# Patient Record
Sex: FEMALE | Race: OTHER | HISPANIC OR LATINO | Employment: UNEMPLOYED | URBAN - METROPOLITAN AREA
[De-identification: names, ages, dates, MRNs, and addresses within clinical notes are randomized per-mention and may not be internally consistent; named-entity substitution may affect disease eponyms.]

---
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
## 2023-06-12 ENCOUNTER — OFFICE VISIT (OUTPATIENT)
Dept: OBGYN CLINIC | Facility: MEDICAL CENTER | Age: 18
End: 2023-06-12
Payer: COMMERCIAL

## 2023-06-12 VITALS
HEIGHT: 60 IN | DIASTOLIC BLOOD PRESSURE: 68 MMHG | BODY MASS INDEX: 30.23 KG/M2 | SYSTOLIC BLOOD PRESSURE: 112 MMHG | WEIGHT: 154 LBS

## 2023-06-12 DIAGNOSIS — Z97.5 BREAKTHROUGH BLEEDING ON NEXPLANON: Primary | ICD-10-CM

## 2023-06-12 DIAGNOSIS — N92.1 BREAKTHROUGH BLEEDING ON NEXPLANON: Primary | ICD-10-CM

## 2023-06-12 PROCEDURE — 99213 OFFICE O/P EST LOW 20 MIN: CPT | Performed by: NURSE PRACTITIONER

## 2023-06-12 RX ORDER — LEVONORGESTREL AND ETHINYL ESTRADIOL 0.1-0.02MG
1 KIT ORAL DAILY
Qty: 90 TABLET | Refills: 0 | Status: SHIPPED | OUTPATIENT
Start: 2023-06-12

## 2023-06-12 NOTE — PROGRESS NOTES
Assessment/Plan:      Diagnoses and all orders for this visit:    Breakthrough bleeding on Nexplanon  -     levonorgestrel-ethinyl estradiol (Aviane) 0 1-20 MG-MCG per tablet; Take 1 tablet by mouth daily          -Reviewed with patient Nexplanon is effective for 3 years  Written information provided and common side effects reviewed  Reviewed with patient common side effect is irregular unpredictable vaginal bleeding     -Rx Aviane 1 tablet daily  Common side effects reviewed  Aches reviewed  Written information provided  -Reviewed recommendation for gonorrhea/chlamydia collection yearly under age 22  Patient declines testing at today's visit  -Reviewed and encouraged safe sexual practices including consistent condom use  -Encouraged to avoid alcohol, tobacco/nicotine and drug use  RTO annual exam    Subjective:     Patient ID: She Mathew is a 21 y o  female  HPI  here with complaints of vaginal bleeding for the past 2 weeks  LMP 23  Menses have been irregular since Nexplanon insertion  Difficult for patient to quantify bleeding  States she has not had 2 weeks of bleeding in a row since using OCPs  Had a trial of OCPs last year  After using OCPs abnormal bleeding improved but did not completely resolve  Patient verbalized understanding this is a side effect from Nexplanon  Is interested in another round of OCPs to see if bleeding improves even more    Pap smears to begin at age 22  Last gonorrhea/chlamydia 10/18/21 negative  Gardasil vaccine series uncertain    Review of Systems   Constitutional: Negative for chills and fever  Respiratory: Negative  Cardiovascular: Negative  Objective:  /68   Ht 5' (1 524 m)   Wt 69 9 kg (154 lb)   LMP 2023 (Approximate)   BMI 30 08 kg/m²      Physical Exam  Constitutional:       Appearance: Normal appearance  Neurological:      Mental Status: She is alert and oriented to person, place, and time     Psychiatric: Mood and Affect: Mood normal          Behavior: Behavior normal

## 2023-08-10 ENCOUNTER — OFFICE VISIT (OUTPATIENT)
Dept: URGENT CARE | Age: 18
End: 2023-08-10
Payer: COMMERCIAL

## 2023-08-10 VITALS
BODY MASS INDEX: 30.7 KG/M2 | TEMPERATURE: 97.3 F | HEIGHT: 60 IN | RESPIRATION RATE: 20 BRPM | OXYGEN SATURATION: 98 % | SYSTOLIC BLOOD PRESSURE: 116 MMHG | WEIGHT: 156.4 LBS | HEART RATE: 89 BPM | DIASTOLIC BLOOD PRESSURE: 64 MMHG

## 2023-08-10 DIAGNOSIS — H65.92 LEFT NON-SUPPURATIVE OTITIS MEDIA: Primary | ICD-10-CM

## 2023-08-10 PROCEDURE — 99213 OFFICE O/P EST LOW 20 MIN: CPT

## 2023-08-10 RX ORDER — AMOXICILLIN 500 MG/1
500 CAPSULE ORAL EVERY 12 HOURS SCHEDULED
Qty: 14 CAPSULE | Refills: 0 | Status: SHIPPED | OUTPATIENT
Start: 2023-08-10 | End: 2023-08-17

## 2023-08-10 NOTE — LETTER
August 10, 2023     Patient: Melody Barnett   YOB: 2005   Date of Visit: 8/10/2023       To Whom it May Concern:    Sangeeta Vazquez was seen in my clinic on 8/10/2023. Please excuse her from work today. Thank you.        Sincerely,          Fadumo Woods

## 2023-08-10 NOTE — PROGRESS NOTES
[facility]        NAME: Slime Marcelino is a 21 y.o. female  : 2005    MRN: [id number]  DATE: August 10, 2023  TIME: 5:03 PM    Assessment and Plan   Left non-suppurative otitis media [H65.92]  1. Left non-suppurative otitis media  amoxicillin (AMOXIL) 500 mg capsule            Patient Instructions     Follow up with PCP in 2-3 days. Proceed to ER if symptoms worsen. Chief Complaint     Chief Complaint   Patient presents with   • Earache     Pt started yesterday with left ear pressure and pain, worsened today. No OTC pain meds taken. Denies fevers. History of Present Illness     21 y.o. F  L ear pain & cough x 1 day  Denies fevers  No CP or SOB  +sick contact  No OTC meds  Denies recent travel  No swimming    Review of Systems   Review of Systems   Constitutional: Negative for chills, fatigue and fever. HENT: Positive for ear pain. Negative for congestion, facial swelling, hearing loss, rhinorrhea, sinus pressure, sneezing, sore throat and trouble swallowing. Eyes: Negative for pain, redness and visual disturbance. Respiratory: Positive for cough. Negative for chest tightness, shortness of breath and wheezing. Cardiovascular: Negative for chest pain and palpitations. Gastrointestinal: Negative for abdominal pain, diarrhea, nausea and vomiting. Genitourinary: Negative for dysuria, flank pain, hematuria and pelvic pain. Musculoskeletal: Negative for arthralgias, back pain and myalgias. Skin: Negative for color change and rash. Neurological: Negative for dizziness, seizures, syncope, weakness, light-headedness and headaches. Psychiatric/Behavioral: Negative for confusion, hallucinations and sleep disturbance. The patient is not nervous/anxious. All other systems reviewed and are negative.         Current Medications       Current Outpatient Medications:   •  amoxicillin (AMOXIL) 500 mg capsule, Take 1 capsule (500 mg total) by mouth every 12 (twelve) hours for 7 days, Disp: 14 capsule, Rfl: 0  •  etonogestrel (NEXPLANON) subdermal implant, 68 mg by Subdermal route once, Disp: , Rfl:   •  levonorgestrel-ethinyl estradiol (Aviane) 0.1-20 MG-MCG per tablet, Take 1 tablet by mouth daily (Patient not taking: Reported on 8/10/2023), Disp: 90 tablet, Rfl: 0    Current Allergies     Allergies as of 08/10/2023 - Reviewed 08/10/2023   Allergen Reaction Noted   • Other Rash 02/06/2017            The following portions of the patient's history were reviewed and updated as appropriate: allergies, current medications, past family history, past medical history, past social history, past surgical history and problem list.     Past Medical History:   Diagnosis Date   • Allergic    • Visual impairment     glasses       History reviewed. No pertinent surgical history. Family History   Problem Relation Age of Onset   • Diabetes Mother    • Hypertension Mother    • Hypertension Father          Medications have been verified. Objective   /64 (BP Location: Right arm, Patient Position: Sitting, Cuff Size: Standard)   Pulse 89   Temp (!) 97.3 °F (36.3 °C) (Tympanic)   Resp 20   Ht 5' (1.524 m)   Wt 70.9 kg (156 lb 6.4 oz)   SpO2 98%   BMI 30.54 kg/m²   No LMP recorded. Patient has had an implant. Physical Exam     Physical Exam  Vitals reviewed. Constitutional:       General: She is not in acute distress. Appearance: Normal appearance. She is not toxic-appearing. HENT:      Head: Normocephalic. Right Ear: Tympanic membrane normal. Tympanic membrane is not erythematous or bulging. Left Ear: Tympanic membrane is erythematous. Tympanic membrane is not bulging. Nose: No congestion or rhinorrhea. Right Sinus: No maxillary sinus tenderness or frontal sinus tenderness. Left Sinus: No maxillary sinus tenderness or frontal sinus tenderness. Mouth/Throat:      Pharynx: Uvula midline.  No oropharyngeal exudate, posterior oropharyngeal erythema or uvula swelling. Tonsils: No tonsillar exudate or tonsillar abscesses. Eyes:      Extraocular Movements: Extraocular movements intact. Conjunctiva/sclera: Conjunctivae normal.      Pupils: Pupils are equal, round, and reactive to light. Cardiovascular:      Rate and Rhythm: Normal rate and regular rhythm. Pulses: Normal pulses. Heart sounds: Normal heart sounds. Pulmonary:      Effort: Pulmonary effort is normal. No tachypnea or respiratory distress. Breath sounds: Normal breath sounds and air entry. No decreased breath sounds, wheezing, rhonchi or rales. Abdominal:      General: Bowel sounds are normal.      Palpations: Abdomen is soft. Tenderness: There is no abdominal tenderness. There is no right CVA tenderness or guarding. Musculoskeletal:         General: Normal range of motion. Cervical back: Normal range of motion. Lymphadenopathy:      Cervical: No cervical adenopathy. Skin:     General: Skin is warm and dry. Neurological:      General: No focal deficit present. Mental Status: She is alert. Sensory: Sensation is intact. Motor: Motor function is intact. Coordination: Coordination is intact. Gait: Gait is intact. Deep Tendon Reflexes: Reflexes are normal and symmetric.

## 2024-02-28 ENCOUNTER — CONSULT (OUTPATIENT)
Dept: OBGYN CLINIC | Facility: MEDICAL CENTER | Age: 19
End: 2024-02-28
Payer: COMMERCIAL

## 2024-02-28 VITALS
SYSTOLIC BLOOD PRESSURE: 118 MMHG | HEIGHT: 60 IN | DIASTOLIC BLOOD PRESSURE: 70 MMHG | WEIGHT: 156.7 LBS | BODY MASS INDEX: 30.77 KG/M2

## 2024-02-28 DIAGNOSIS — Z30.46 FAMILY PLANNING, SUBDERMAL CONTRACEPTIVE CHECKING/REINSERTION/REMOVAL: Primary | ICD-10-CM

## 2024-02-28 PROCEDURE — 11983 REMOVE/INSERT DRUG IMPLANT: CPT | Performed by: OBSTETRICS & GYNECOLOGY

## 2024-02-28 NOTE — PATIENT INSTRUCTIONS
Etonogestrel (Nexplanon, Implanon) - (Implant)   Why this medicine is used:   Prevents pregnancy.  Contact a nurse or doctor right away if you have:  Chest pain, trouble breathing, coughing up blood  Dark urine or pale stools, yellow skin or eyes  Numbness or weakness, problems with vision, speech, or walking, leg pain  Pain in your lower leg (calf) or severe pain in your abdomen  Heavy vaginal bleeding  Nausea, vomiting, loss of appetite, stomach pain, headache     Common side effects:  Acne or pimples, mood changes, weight gain  Pain, tingling, bleeding, bruising, redness, itching, or swelling where the implant is placed  © Copyright Merative 2023 Information is for End User's use only and may not be sold, redistributed or otherwise used for commercial purposes.

## 2024-02-28 NOTE — PROGRESS NOTES
"Universal Protocol:  Consent: Written consent obtained.  Risks and benefits: risks, benefits and alternatives were discussed  Consent given by: patient  Time out: Immediately prior to procedure a \"time out\" was called to verify the correct patient, procedure, equipment, support staff and site/side marked as required.  Patient understanding: patient states understanding of the procedure being performed  Patient consent: the patient's understanding of the procedure matches consent given  Procedure consent: procedure consent matches procedure scheduled  Relevant documents: relevant documents present and verified  Test results: test results available and properly labeled  Patient identity confirmed: verbally with patient  Remove and insert drug implant    Date/Time: 2/28/2024 2:30 PM    Performed by: Rula Desouza MD  Authorized by: Rula Desouza MD    Indication:     Indication: Presence of non-biodegradable drug delivery implant    Pre-procedure:     Pre-procedure timeout performed: yes      Prepped with: povidone-iodine      Local anesthetic:  Lidocaine 1%  Procedure:     Procedure:  Removal with reinsertion    Small stab incision was made in arm: yes      Left/right:  Left  Comments:      Once assured of adequate anesthesia, a 2 mm skin incision was made with a knife.  The Nexplanon was grasped with a mosquito clamp and removed in its entirety.  The preloaded contraceptive capsule trocar was placed subdermally.  Contraceptive capsule was inserted and trocar removed.  Palpation confirmed by provider.  Site was closed with Steri-Strips and pressure bandage applied.     "

## 2024-06-13 DIAGNOSIS — Z97.5 BREAKTHROUGH BLEEDING ON NEXPLANON: ICD-10-CM

## 2024-06-13 DIAGNOSIS — N92.1 BREAKTHROUGH BLEEDING ON NEXPLANON: ICD-10-CM

## 2024-06-13 RX ORDER — LEVONORGESTREL/ETHIN.ESTRADIOL 0.1-0.02MG
1 TABLET ORAL DAILY
Qty: 90 TABLET | Refills: 1 | Status: SHIPPED | OUTPATIENT
Start: 2024-06-13

## 2025-01-06 ENCOUNTER — VBI (OUTPATIENT)
Dept: ADMINISTRATIVE | Facility: OTHER | Age: 20
End: 2025-01-06

## 2025-03-05 ENCOUNTER — OFFICE VISIT (OUTPATIENT)
Dept: URGENT CARE | Facility: CLINIC | Age: 20
End: 2025-03-05
Payer: COMMERCIAL

## 2025-03-05 VITALS
OXYGEN SATURATION: 99 % | DIASTOLIC BLOOD PRESSURE: 62 MMHG | HEART RATE: 97 BPM | TEMPERATURE: 98.2 F | BODY MASS INDEX: 27.73 KG/M2 | WEIGHT: 142 LBS | SYSTOLIC BLOOD PRESSURE: 106 MMHG | RESPIRATION RATE: 16 BRPM

## 2025-03-05 DIAGNOSIS — R53.83 OTHER FATIGUE: Primary | ICD-10-CM

## 2025-03-05 DIAGNOSIS — Z02.89 ENCOUNTER TO OBTAIN EXCUSE FROM WORK: ICD-10-CM

## 2025-03-05 PROCEDURE — 99212 OFFICE O/P EST SF 10 MIN: CPT

## 2025-03-05 PROCEDURE — 87636 SARSCOV2 & INF A&B AMP PRB: CPT

## 2025-03-05 NOTE — LETTER
March 5, 2025     Patient: Amy Jaramillo   YOB: 2005   Date of Visit: 3/5/2025       To Whom it May Concern:    Amy Jaramillo was seen in my clinic on 3/5/2025. She may return to work on 03/06/2025 .    If you have any questions or concerns, please don't hesitate to call.         Sincerely,          DIANA Resendez        CC: No Recipients

## 2025-03-05 NOTE — PATIENT INSTRUCTIONS
You have a Covid/Flu PCR test pending. You can download [facility] for the results which take approximately 24-48 hours. You will be notified if positive.      Your symptoms are consistent with a viral illness.    For nasal/sinus congestion you can try steam, warm compresses, saline nasal spray, Neti pot, nasal steroid (Flonase, Nasocort), or nasal decongestant (Afrin - for 3 days only).    You can try a decongestant (Sudafed) if > 6 years of age and no history of high blood pressure.    For cough you can take an over-the-counter expectorant such as plain Robitussion or Mucinex. A spoonful of honey at bedtime may also be helpful.    For sore throat you can use Cepacol lozenges, do warm salt water gargles, drink warm water with lemon or herbal teas, or use an over-the-counter throat spray (Chloraseptic).    You can take ibuprofen/Motrin and acetaminophen/Tylenol as needed for pain, fever, body aches. Do not take ibuprofen/Motrin/Advil if you have a history of heart disease, bleeding ulcers, or if you take blood thinners.     Drink plenty of fluids to stay hydrated. Airborne or Emergen-C for extra vitamin C and zinc.    Follow up with your PCP in 3-5 days for persistent symptoms.    Go to the ER if symptoms worsen.

## 2025-03-05 NOTE — PROGRESS NOTES
[facility]        NAME: Amy Jaramillo is a 19 y.o. female  : 2005    MRN: [id number]  DATE: 2025  TIME: 11:33 AM    Assessment and Plan   Other fatigue [R53.83]  1. Other fatigue  Covid/Flu- Office Collect Normal      2. Encounter to obtain excuse from work              Patient Instructions     You have a Covid/Flu PCR test pending. [facility] results which take approximately 24-48 hours. You will be notified if positive.      Your symptoms are consistent with a viral illness.    For nasal/sinus congestion you can try steam, warm compresses, saline nasal spray, Neti pot, nasal steroid (Flonase, Nasocort), or nasal decongestant (Afrin - for 3 days only).    You can try a decongestant (Sudafed) if > 6 years of age and no history of high blood pressure.    For cough you can take an over-the-counter expectorant such as plain Robitussion or Mucinex. A spoonful of honey at bedtime may also be helpful.    For sore throat you can use Cepacol lozenges, do warm salt water gargles, drink warm water with lemon or herbal teas, or use an over-the-counter throat spray (Chloraseptic).    You can take ibuprofen/Motrin and acetaminophen/Tylenol as needed for pain, fever, body aches. Do not take ibuprofen/Motrin/Advil if you have a history of heart disease, bleeding ulcers, or if you take blood thinners.     Drink plenty of fluids to stay hydrated. Airborne or Emergen-C for extra vitamin C and zinc.    Follow up with your PCP in 3-5 days for persistent symptoms.    Go to the ER if symptoms worsen.     If tests are performed, our office will contact you with results only if changes need to made to the care plan discussed with you at the visit. You can review your full results on [facility].      Chief Complaint     Chief Complaint   Patient presents with    Nausea     Pt presents with nausea and fatigue that started at work today.  Pt is requesting work note.           History of Present Illness       19-year-old female who presents with request for return to work note after leaving today when she experienced sudden onset fatigue and nausea. Patient denies fevers/chills, congestion, sore throat, cough, CP, SOB, abdominal pain, vomiting, diarrhea, and rash. No OTC treatments tried prior to arrival.        Review of Systems   Review of Systems   Constitutional:  Positive for fatigue. Negative for chills and fever.   HENT:  Negative for congestion, ear pain, sinus pressure and sore throat.    Eyes:  Negative for discharge and redness.   Respiratory:  Negative for cough, shortness of breath and wheezing.    Cardiovascular:  Negative for chest pain and palpitations.   Gastrointestinal:  Positive for nausea. Negative for abdominal pain, diarrhea and vomiting.   Skin:  Negative for rash.   Neurological:  Negative for dizziness, light-headedness and headaches.         Current Medications       Current Outpatient Medications:     etonogestrel (NEXPLANON) subdermal implant, 68 mg by Subdermal route once, Disp: , Rfl:     levonorgestrel-ethinyl estradiol (AVIANE,ALESSE,LESSINA) 0.1-20 MG-MCG per tablet, take 1 tablet by mouth once daily, Disp: 90 tablet, Rfl: 1    Current Allergies     Allergies as of 03/05/2025 - Reviewed 03/05/2025   Allergen Reaction Noted    Other Rash 02/06/2017            The following portions of the patient's history were reviewed and updated as appropriate: allergies, current medications, past family history, past medical history, past social history, past surgical history and problem list.     Past Medical History:   Diagnosis Date    Allergic     Visual impairment     glasses       History reviewed. No pertinent surgical history.    Family History   Problem Relation Age of Onset    Diabetes Mother     Hypertension Mother     Hypertension Father          Medications have been verified.        Objective   /62   Pulse 97   Temp 98.2 °F (36.8 °C)    Resp 16   Wt 64.4 kg (142 lb)   SpO2 99%   BMI 27.73 kg/m²        Physical Exam     Physical Exam  Vitals and nursing note reviewed.   Constitutional:       General: She is not in acute distress.     Appearance: She is not ill-appearing or diaphoretic.   HENT:      Head: Normocephalic and atraumatic.      Right Ear: Tympanic membrane, ear canal and external ear normal.      Left Ear: Tympanic membrane, ear canal and external ear normal.      Nose: Nose normal.      Mouth/Throat:      Mouth: Mucous membranes are moist.      Pharynx: Oropharynx is clear.   Eyes:      Conjunctiva/sclera: Conjunctivae normal.   Cardiovascular:      Rate and Rhythm: Normal rate and regular rhythm.      Pulses: Normal pulses.      Heart sounds: Normal heart sounds.   Pulmonary:      Effort: Pulmonary effort is normal.      Breath sounds: Normal breath sounds.   Musculoskeletal:         General: Normal range of motion.      Cervical back: Normal range of motion and neck supple.   Skin:     General: Skin is warm and dry.      Capillary Refill: Capillary refill takes less than 2 seconds.   Neurological:      Mental Status: She is alert and oriented to person, place, and time.

## 2025-03-06 LAB
FLUAV RNA RESP QL NAA+PROBE: NEGATIVE
FLUBV RNA RESP QL NAA+PROBE: NEGATIVE
SARS-COV-2 RNA RESP QL NAA+PROBE: NEGATIVE

## 2025-03-24 ENCOUNTER — OFFICE VISIT (OUTPATIENT)
Dept: FAMILY MEDICINE CLINIC | Facility: CLINIC | Age: 20
End: 2025-03-24
Payer: COMMERCIAL

## 2025-03-24 VITALS
HEIGHT: 60 IN | WEIGHT: 141.8 LBS | HEART RATE: 79 BPM | SYSTOLIC BLOOD PRESSURE: 110 MMHG | OXYGEN SATURATION: 97 % | BODY MASS INDEX: 27.84 KG/M2 | DIASTOLIC BLOOD PRESSURE: 60 MMHG

## 2025-03-24 DIAGNOSIS — Z13.1 SCREENING FOR DIABETES MELLITUS (DM): ICD-10-CM

## 2025-03-24 DIAGNOSIS — Z13.220 LIPID SCREENING: ICD-10-CM

## 2025-03-24 DIAGNOSIS — Z00.00 ANNUAL PHYSICAL EXAM: Primary | ICD-10-CM

## 2025-03-24 PROCEDURE — 99395 PREV VISIT EST AGE 18-39: CPT | Performed by: PHYSICIAN ASSISTANT

## 2025-03-24 NOTE — PROGRESS NOTES
Adult Annual Physical  Name: Amy Jaramillo      : 2005      MRN: [id number]  Encounter Provider: Lyndsey Llamas PA-C  Encounter Date: 3/24/2025   Encounter department: [facility]    Assessment & Plan  Annual physical exam  Patient is scheduled to see her GYN in May but only for nexplanon removal.  She is encouraged to call her GYN and schedule a Pap as she has not had 1 and last saw her GYN just before she turned 19.  Will check fasting lipid and CMP.  Encouraged and offered Gardasil vaccine series initiation today patient declined info sheet given.       Lipid screening    Orders:  •  Comprehensive metabolic panel    Screening for diabetes mellitus (DM)    Orders:  •  Lipid Panel with Direct LDL reflex    Preventive Screenings:  - Diabetes Screening: orders placed  - Cholesterol Screening: orders placed   - Chlamydia Screening: risks/benefits discussed   - Hepatitis C screening: screening up-to-date   - HIV screening: screening up-to-date   - Cervical cancer screening: risks/benefits discussed   - Colon cancer screening: screening not indicated   - Lung cancer screening: screening not indicated     Immunizations:  - Immunizations due: Influenza, HPV (Gardasil 9) and Hepatitis A  - Risks/benefits immunizations discussed           History of Present Illness     Adult Annual Physical:  Patient presents for annual physical.     Diet and Physical Activity:  - Diet/Nutrition: poor diet.  - Exercise: no formal exercise.    Depression Screening:  - PHQ-2 Score: 0    General Health:  - Sleep: sleeps well.  - Hearing: normal hearing bilateral ears.  - Vision: wears contacts, vision problems and most recent eye exam > 1 year ago.  - Dental:. Needs dental as less than one year.    /GYN Health:  - Follows with GYN: yes.   - Menopause: premenopausal.   - History of STDs: no  - Contraception: injectable contraception.      Advanced Care Planning:  - Has an advanced directive?: no    - Has  a durable medical POA?: no    - [facility] document given to patient?: no      Review of Systems   Constitutional: Negative.    HENT: Negative.     Eyes: Negative.    Respiratory: Negative.     Cardiovascular: Negative.    Gastrointestinal: Negative.    Endocrine: Negative.    Genitourinary: Negative.    Musculoskeletal: Negative.    Skin: Negative.    Allergic/Immunologic: Negative.    Neurological: Negative.    Hematological: Negative.    Psychiatric/Behavioral: Negative.           Objective   /60 (BP Location: Right arm, Patient Position: Sitting, Cuff Size: Standard)   Pulse 79   Ht 5' (1.524 m)   Wt 64.3 kg (141 lb 12.8 oz)   SpO2 97%   BMI 27.69 kg/m²     Physical Exam  Vitals and nursing note reviewed.   Constitutional:       General: She is not in acute distress.     Appearance: She is well-developed. She is not diaphoretic.   HENT:      Head: Normocephalic and atraumatic.      Right Ear: External ear normal.      Left Ear: External ear normal.      Nose: Nose normal.      Mouth/Throat:      Pharynx: No oropharyngeal exudate.   Eyes:      General: No scleral icterus.        Right eye: No discharge.         Left eye: No discharge.      Conjunctiva/sclera: Conjunctivae normal.      Pupils: Pupils are equal, round, and reactive to light.   Neck:      Thyroid: No thyromegaly.      Vascular: No JVD.      Trachea: No tracheal deviation.   Cardiovascular:      Rate and Rhythm: Normal rate and regular rhythm.      Heart sounds: Normal heart sounds. No murmur heard.     No friction rub. No gallop.   Pulmonary:      Effort: Pulmonary effort is normal. No respiratory distress.      Breath sounds: Normal breath sounds. No stridor. No wheezing or rales.   Chest:      Chest wall: No tenderness.   Abdominal:      General: Bowel sounds are normal. There is no distension.      Palpations: Abdomen is soft. There is no mass.      Tenderness: There is no abdominal tenderness. There is no guarding or rebound.      Hernia: No  hernia is present.   Musculoskeletal:         General: No deformity. Normal range of motion.      Cervical back: Normal range of motion and neck supple.   Lymphadenopathy:      Cervical: No cervical adenopathy.   Skin:     General: Skin is warm and dry.      Capillary Refill: Capillary refill takes more than 3 seconds.      Findings: No rash.   Neurological:      Mental Status: She is alert and oriented to person, place, and time.      Motor: No abnormal muscle tone.      Coordination: Coordination normal.      Deep Tendon Reflexes: Reflexes normal.   Psychiatric:         Behavior: Behavior normal.         Thought Content: Thought content normal.         Judgment: Judgment normal.

## 2025-04-10 ENCOUNTER — TELEPHONE (OUTPATIENT)
Age: 20
End: 2025-04-10

## 2025-04-16 ENCOUNTER — OFFICE VISIT (OUTPATIENT)
Dept: URGENT CARE | Age: 20
End: 2025-04-16
Payer: COMMERCIAL

## 2025-04-16 VITALS
RESPIRATION RATE: 18 BRPM | HEART RATE: 88 BPM | SYSTOLIC BLOOD PRESSURE: 112 MMHG | WEIGHT: 147 LBS | HEIGHT: 60 IN | OXYGEN SATURATION: 99 % | BODY MASS INDEX: 28.86 KG/M2 | DIASTOLIC BLOOD PRESSURE: 80 MMHG | TEMPERATURE: 97.5 F

## 2025-04-16 DIAGNOSIS — J02.0 STREP PHARYNGITIS: Primary | ICD-10-CM

## 2025-04-16 LAB — S PYO AG THROAT QL: POSITIVE

## 2025-04-16 PROCEDURE — 99213 OFFICE O/P EST LOW 20 MIN: CPT | Performed by: PHYSICIAN ASSISTANT

## 2025-04-16 PROCEDURE — 87880 STREP A ASSAY W/OPTIC: CPT | Performed by: PHYSICIAN ASSISTANT

## 2025-04-16 RX ORDER — METHYLPREDNISOLONE 4 MG/1
TABLET ORAL
Qty: 1 EACH | Refills: 0 | Status: SHIPPED | OUTPATIENT
Start: 2025-04-16

## 2025-04-16 RX ORDER — AMOXICILLIN 500 MG/1
500 CAPSULE ORAL EVERY 12 HOURS SCHEDULED
Qty: 20 CAPSULE | Refills: 0 | Status: SHIPPED | OUTPATIENT
Start: 2025-04-16 | End: 2025-04-26

## 2025-04-16 NOTE — LETTER
April 16, 2025     Patient: Amy Jaramillo   YOB: 2005   Date of Visit: 4/16/2025       To Whom it May Concern:    Amy Jaramillo was seen in my clinic on 4/16/2025. She may return once fever free for 24 hours.    If you have any questions or concerns, please don't hesitate to call.         Sincerely,          Millie Benson PA-C        CC: No Recipients

## 2025-04-16 NOTE — PATIENT INSTRUCTIONS
Patient was educated on strep. Educated to eat on antibiotics. Do not take OTC anti-inflammatories while on steroids. Any chest pain or shortness of breath go to ED.    Any fever or trouble swallowing go to ED.    Follow up with PCP.

## 2025-04-16 NOTE — PROGRESS NOTES
[facility]        NAME: Amy Jaramillo is a 19 y.o. female  : 2005    MRN: [id number]  DATE: 2025  TIME: 10:24 AM    Assessment and Plan   Strep pharyngitis [J02.0]  1. Strep pharyngitis  POCT rapid ANTIGEN strepA    amoxicillin (AMOXIL) 500 mg capsule    methylPREDNISolone 4 MG tablet therapy pack        Rapid Strep- positive.     Patient Instructions   Patient was educated on strep. Educated to eat on antibiotics. Do not take OTC anti-inflammatories while on steroids. Any chest pain or shortness of breath go to ED.    Any fever or trouble swallowing go to ED.    Follow up with PCP.     Follow up with PCP in 3-5 days.  Proceed to  ER if symptoms worsen.    If tests have been performed at [facility], our office will contact you with results if changes need to be made to the care plan discussed with you at the visit.  [facility]    Chief Complaint     Chief Complaint   Patient presents with    Sore Throat     X 2-3 days, productive cough, denies fever or chills          History of Present Illness       Patient presents to the urgent care complaining of sore throat and cough for 3-4 days. Denies any history of asthma or diabetes. Denies any allergies to medications. Requested work note.     Sore Throat   Associated symptoms include coughing.       Review of Systems   Review of Systems   Constitutional: Negative.    HENT:  Positive for sore throat.    Respiratory:  Positive for cough.    Cardiovascular: Negative.    Psychiatric/Behavioral: Negative.           Current Medications       Current Outpatient Medications:     amoxicillin (AMOXIL) 500 mg capsule, Take 1 capsule (500 mg total) by mouth every 12 (twelve) hours for 10 days, Disp: 20 capsule, Rfl: 0    etonogestrel (NEXPLANON) subdermal implant, 68 mg by Subdermal route once, Disp: , Rfl:     levonorgestrel-ethinyl estradiol (AVIANE,ALESSE,LESSINA) 0.1-20 MG-MCG per tablet, take 1  tablet by mouth once daily, Disp: 90 tablet, Rfl: 1    methylPREDNISolone 4 MG tablet therapy pack, Use as directed on package, Disp: 1 each, Rfl: 0    Current Allergies     Allergies as of 04/16/2025 - Reviewed 04/16/2025   Allergen Reaction Noted    Other Rash 02/06/2017            The following portions of the patient's history were reviewed and updated as appropriate: allergies, current medications, past family history, past medical history, past social history, past surgical history and problem list.     Past Medical History:   Diagnosis Date    Allergic     Visual impairment     glasses       History reviewed. No pertinent surgical history.    Family History   Problem Relation Age of Onset    Diabetes Mother     Hypertension Mother     Hypertension Father          Medications have been verified.        Objective   /80 (BP Location: Left arm, Patient Position: Sitting, Cuff Size: Standard)   Pulse 88   Temp 97.5 °F (36.4 °C)   Resp 18   Ht 5' (1.524 m)   Wt 66.7 kg (147 lb)   SpO2 99%   BMI 28.71 kg/m²   No LMP recorded. Patient has had an implant.       Physical Exam     Physical Exam  Vitals and nursing note reviewed.   Constitutional:       Appearance: Normal appearance.   HENT:      Head: Normocephalic.      Right Ear: Tympanic membrane, ear canal and external ear normal.      Left Ear: Tympanic membrane, ear canal and external ear normal.      Mouth/Throat:      Mouth: Mucous membranes are moist.      Pharynx: Posterior oropharyngeal erythema present.   Eyes:      Extraocular Movements: Extraocular movements intact.   Cardiovascular:      Rate and Rhythm: Normal rate and regular rhythm.      Heart sounds: Normal heart sounds.   Pulmonary:      Breath sounds: Normal breath sounds. No wheezing.   Neurological:      General: No focal deficit present.      Mental Status: She is alert and oriented to person, place, and time.   Psychiatric:         Mood and Affect: Mood normal.         Behavior:  Behavior normal.

## 2025-04-25 ENCOUNTER — NURSE TRIAGE (OUTPATIENT)
Age: 20
End: 2025-04-25

## 2025-04-25 ENCOUNTER — PROCEDURE VISIT (OUTPATIENT)
Dept: OBGYN CLINIC | Facility: CLINIC | Age: 20
End: 2025-04-25
Payer: COMMERCIAL

## 2025-04-25 VITALS
BODY MASS INDEX: 28.07 KG/M2 | DIASTOLIC BLOOD PRESSURE: 70 MMHG | SYSTOLIC BLOOD PRESSURE: 110 MMHG | WEIGHT: 143 LBS | HEIGHT: 60 IN

## 2025-04-25 DIAGNOSIS — Z30.46 ENCOUNTER FOR NEXPLANON REMOVAL: Primary | ICD-10-CM

## 2025-04-25 PROCEDURE — 11982 REMOVE DRUG IMPLANT DEVICE: CPT | Performed by: ADVANCED PRACTICE MIDWIFE

## 2025-04-25 NOTE — PROGRESS NOTES
Remove and insert drug implant    Date/Time: 4/25/2025 2:30 PM    Performed by: Keely Davila CNM  Authorized by: Keely Davila CNM    Consent:       Consent obtained:  Written      Consent given by:  Patient      Procedural risks discussed:  Bleeding, possible continued pain, damage to other organs and infection      Patient questions answered:  yes       Patient agrees, verbalizes understanding, and wants to proceed:  yes       Instructions and paperwork completed:  yes    Indication:       Indication:  presence of non-biodegradable drug delivery implant    Pre-procedure:       Pre-procedure timeout performed:  yes       Prepped with:  povidone-iodine       Local anesthetic:  Lidocaine 1%      The site was cleaned and prepped in a sterile fashion:  yes    Procedure:       Procedure:  Removal      Small stab incision was made in arm:  yes       The tip of the implant emerged from the skin incision:  Yes       Adherent tissue was removed with blunt dissection:  Yes       A tissue sheath was encountered and gently incised :  Yes       The implant was grasped with forceps:  Yes       The implant was removed  :  Yes       Site was closed and/or dressed with:  Steri-strips, gauze and ace bandage      Patient tolerated procedure well:  Patient tolerated procedure well

## 2025-04-25 NOTE — PATIENT INSTRUCTIONS
Instructed to keep clean and dry for next 24 hours.  Allow steri strips to stay in place for 3 days, then may remove and keep covered with band-aid until healed. Reviewed s/s infection to call office if notice any changes. May have some bruising as it heals. Can take ibuprofen 600 mg q 8 hours, if needed.

## 2025-04-25 NOTE — TELEPHONE ENCOUNTER
"  FOLLOW UP: see in offiece     REASON FOR CONVERSATION: Contraception    SYMPTOMS: left arm pain 6/10 due to nexplanon and down to fingers from nexplanon insertion site.     OTHER:   Pt calling in saying that she's having pain in her left arm from where her nexplanon implant is down towards her fingers. Pts nexplanon removal date is scheduled for 5/20/25. Pt was provided care advice of when to call back, to take tylenol or motrin, use hot/cold compresses, and to call back if her symptoms worsen.     DISPOSITION: see today in office     Reason for Disposition   Patient wants to be seen    Answer Assessment - Initial Assessment Questions  1. TYPE: \"What type of implant are you using?\"  (e.g., Jadelle, Nexplanon, Norplant)       Nexplanon   2. START DATE: \"When was it was inserted?\" (e.g., date; weeks, months, years ago)       2/2024  3. LOCATION: \"Where is the implant located?\" (e.g., abdomen, arm; left or right)      Left   4. SYMPTOM: \"What is the main symptom (or question) you're concerned about?\"      Pain from where implant is down towards fingers 6/10   5. ONSET: \"When did the pain start?\"      October   6. VAGINAL BLEEDING: \"Are you having any unusual vaginal bleeding?\"      Pt denies   7. ABDOMEN OR PELVIC PAIN: \"Are you having any pain in your abdomen or pelvic area?\" (Scale: 0, 1-10; none, mild, moderate, severe)      Pt denies   8. PREGNANCY: \"Are you concerned you might be pregnant?\" \"When was your last menstrual period?\"      Pt denies pregnancy, LMP 3/2025    Protocols used: Contraception - Implant Symptoms and Questions-Adult-[place]    "

## 2025-06-09 ENCOUNTER — TELEPHONE (OUTPATIENT)
Age: 20
End: 2025-06-09

## 2025-06-09 NOTE — TELEPHONE ENCOUNTER
Who called:STAFF     Is the patient Pregnant ?Yes  If so, How many weeks? 4wks    Reason for the Call:Schedule est D&V appt.    Action Taken:Spoke to patient      Outcome/Plan/ Recommendations:  Scheduled patient on 7/21 as per LM.

## 2025-06-09 NOTE — TELEPHONE ENCOUNTER
Established patient called with +HPT, LMP 5/6/25. No available d&v appts. Attempted to contact office. Please reach out to pt.

## 2025-06-13 ENCOUNTER — TELEPHONE (OUTPATIENT)
Age: 20
End: 2025-06-13

## 2025-06-13 NOTE — TELEPHONE ENCOUNTER
Patient calling to inquire about pregnancy activity restrictions. 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 Reviewed general recommendation lifting for no greater than 25-30 lbs. D&V scheduled for 7/21.    Message to Keely TAM with request.

## 2025-06-18 ENCOUNTER — HOSPITAL ENCOUNTER (EMERGENCY)
Facility: HOSPITAL | Age: 20
Discharge: HOME | End: 2025-06-18
Attending: EMERGENCY MEDICINE
Payer: COMMERCIAL

## 2025-06-18 ENCOUNTER — APPOINTMENT (EMERGENCY)
Dept: RADIOLOGY | Facility: HOSPITAL | Age: 20
End: 2025-06-18
Payer: COMMERCIAL

## 2025-06-18 VITALS
DIASTOLIC BLOOD PRESSURE: 61 MMHG | SYSTOLIC BLOOD PRESSURE: 119 MMHG | OXYGEN SATURATION: 99 % | HEART RATE: 93 BPM | RESPIRATION RATE: 18 BRPM | TEMPERATURE: 97.6 F

## 2025-06-18 DIAGNOSIS — N93.9 VAGINAL BLEEDING: ICD-10-CM

## 2025-06-18 DIAGNOSIS — Z34.91 FIRST TRIMESTER PREGNANCY: Primary | ICD-10-CM

## 2025-06-18 LAB
ABO GROUP BLD: NORMAL
ANION GAP SERPL CALCULATED.3IONS-SCNC: 5 MMOL/L (ref 4–13)
B-HCG SERPL-ACNC: 4674 MIU/ML (ref 0–5)
BACTERIA UR QL AUTO: ABNORMAL /HPF
BASOPHILS # BLD AUTO: 0.03 THOUSANDS/ÂΜL (ref 0–0.1)
BASOPHILS NFR BLD AUTO: 1 % (ref 0–1)
BILIRUB UR QL STRIP: NEGATIVE
BUN SERPL-MCNC: 19 MG/DL (ref 5–25)
CALCIUM SERPL-MCNC: 9 MG/DL (ref 8.4–10.2)
CHLORIDE SERPL-SCNC: 105 MMOL/L (ref 96–108)
CLARITY UR: CLEAR
CO2 SERPL-SCNC: 26 MMOL/L (ref 21–32)
COLOR UR: ABNORMAL
CREAT SERPL-MCNC: 0.65 MG/DL (ref 0.6–1.3)
EOSINOPHIL # BLD AUTO: 0.06 THOUSAND/ÂΜL (ref 0–0.61)
EOSINOPHIL NFR BLD AUTO: 1 % (ref 0–6)
ERYTHROCYTE [DISTWIDTH] IN BLOOD BY AUTOMATED COUNT: 12.4 % (ref 11.6–15.1)
EXT PREGNANCY TEST URINE: POSITIVE
EXT. CONTROL: ABNORMAL
GFR SERPL CREATININE-BSD FRML MDRD: 127 ML/MIN/1.73SQ M
GLUCOSE SERPL-MCNC: 77 MG/DL (ref 65–140)
GLUCOSE UR STRIP-MCNC: NEGATIVE MG/DL
HCT VFR BLD AUTO: 38.9 % (ref 34.8–46.1)
HGB BLD-MCNC: 12.7 G/DL (ref 11.5–15.4)
HGB UR QL STRIP.AUTO: NEGATIVE
IMM GRANULOCYTES # BLD AUTO: 0.01 THOUSAND/UL (ref 0–0.2)
IMM GRANULOCYTES NFR BLD AUTO: 0 % (ref 0–2)
KETONES UR STRIP-MCNC: NEGATIVE MG/DL
LEUKOCYTE ESTERASE UR QL STRIP: NEGATIVE
LYMPHOCYTES # BLD AUTO: 2.06 THOUSANDS/ÂΜL (ref 0.6–4.47)
LYMPHOCYTES NFR BLD AUTO: 31 % (ref 14–44)
MCH RBC QN AUTO: 29.4 PG (ref 26.8–34.3)
MCHC RBC AUTO-ENTMCNC: 32.6 G/DL (ref 31.4–37.4)
MCV RBC AUTO: 90 FL (ref 82–98)
MONOCYTES # BLD AUTO: 0.37 THOUSAND/ÂΜL (ref 0.17–1.22)
MONOCYTES NFR BLD AUTO: 6 % (ref 4–12)
MUCOUS THREADS UR QL AUTO: ABNORMAL
NEUTROPHILS # BLD AUTO: 4.07 THOUSANDS/ÂΜL (ref 1.85–7.62)
NEUTS SEG NFR BLD AUTO: 61 % (ref 43–75)
NITRITE UR QL STRIP: NEGATIVE
NON-SQ EPI CELLS URNS QL MICRO: ABNORMAL /HPF
NRBC BLD AUTO-RTO: 0 /100 WBCS
PH UR STRIP.AUTO: 7.5 [PH]
PLATELET # BLD AUTO: 240 THOUSANDS/UL (ref 149–390)
PMV BLD AUTO: 9.9 FL (ref 8.9–12.7)
POTASSIUM SERPL-SCNC: 3.8 MMOL/L (ref 3.5–5.3)
PROT UR STRIP-MCNC: ABNORMAL MG/DL
RBC # BLD AUTO: 4.32 MILLION/UL (ref 3.81–5.12)
RBC #/AREA URNS AUTO: ABNORMAL /HPF
RH BLD: POSITIVE
SODIUM SERPL-SCNC: 136 MMOL/L (ref 135–147)
SP GR UR STRIP.AUTO: 1.03 (ref 1–1.03)
UROBILINOGEN UR STRIP-ACNC: <2 MG/DL
WBC # BLD AUTO: 6.6 THOUSAND/UL (ref 4.31–10.16)
WBC #/AREA URNS AUTO: ABNORMAL /HPF

## 2025-06-18 PROCEDURE — 76815 OB US LIMITED FETUS(S): CPT

## 2025-06-18 PROCEDURE — 84702 CHORIONIC GONADOTROPIN TEST: CPT

## 2025-06-18 PROCEDURE — 86901 BLOOD TYPING SEROLOGIC RH(D): CPT

## 2025-06-18 PROCEDURE — 85025 COMPLETE CBC W/AUTO DIFF WBC: CPT

## 2025-06-18 PROCEDURE — 81025 URINE PREGNANCY TEST: CPT | Performed by: EMERGENCY MEDICINE

## 2025-06-18 PROCEDURE — 86900 BLOOD TYPING SEROLOGIC ABO: CPT

## 2025-06-18 PROCEDURE — 99284 EMERGENCY DEPT VISIT MOD MDM: CPT

## 2025-06-18 PROCEDURE — 36415 COLL VENOUS BLD VENIPUNCTURE: CPT

## 2025-06-18 PROCEDURE — 81001 URINALYSIS AUTO W/SCOPE: CPT | Performed by: EMERGENCY MEDICINE

## 2025-06-18 PROCEDURE — 80048 BASIC METABOLIC PNL TOTAL CA: CPT

## 2025-06-18 PROCEDURE — 99284 EMERGENCY DEPT VISIT MOD MDM: CPT | Performed by: EMERGENCY MEDICINE

## 2025-06-18 NOTE — ED NOTES
Patient ambulatory to bathroom at this time to provide urine sample.      Perlita Decker RN  06/18/25 [id number]     Pulmonology

## 2025-06-18 NOTE — Clinical Note
Waylon Zavala accompanied Amy Jaramillo to the emergency department on 6/18/2025.    Return date if applicable: 06/19/2025        If you have any questions or concerns, please don't hesitate to call.      Ellie Wang, DO

## 2025-06-18 NOTE — ED PROVIDER NOTES
Time reflects when diagnosis was documented in both MDM as applicable and the Disposition within this note       Time User Action Codes Description Comment    2025 10:20 AM Ellie Wang Add [Z34.91] First trimester pregnancy     2025 10:20 AM Ellie Wang Add [N93.9] Vaginal bleeding           ED Disposition       ED Disposition   Discharge    Condition   Stable    Date/Time    10:14 AM    Comment   Amy Jaramillo discharge to home/self care.                   Assessment & Plan       Medical Decision Making  Amount and/or Complexity of Data Reviewed  Labs: ordered. Decision-making details documented in ED Course.  Radiology: ordered. Decision-making details documented in ED Course.    Patient is 19 y.o. female , LMP mid May, approximately 6 weeks pregnant with home pregnancy test presenting for abdominal cramping and vaginal bleeding.See history and physical documented below.     DDX: bleeding in early pregnancy, ectopic pregnancy, spontaneous . Plan: basic labs, hcg quant, ABO for RH status, UA to r/o infection, transvaginal US to confirm IUP.     View ED course below for further discussion on patient workup.       All labs reviewed and utilized in the medical decision making process  All radiology studies independently viewed by me and interpreted by the radiologist.  I reviewed all testing with the patient.     Advised of indeterminate US. Educated that this may be early pregnancy vaginal bleeding or early miscarriage. Rx. For repeat beta hcg given, recommended OB follow up. Strict return precautions given. Patient verbalized understanding of return precautions and need for follow up.       ED Course as of 25 2233      0815 PREGNANCY TEST URINE(!): Positive   0819 WBC: 6.60   0819 Hemoglobin: 12.7   0819 Platelet Count: 240   0841 Basic metabolic panel  Unremarkable    0920 Bacteria, UA: None Seen   0920 Leukocytes, UA: Negative   0920  Nitrite, UA: Negative   1013 US OB pregnancy limited with transvaginal  IMPRESSION:     Small amount of intracavitary fluid at the uterine fundus, not definitive for intrauterine gestation. Appearance is nonspecific with differential considerations including early normal gestation, pseudogestational sac with ectopic pregnancy, or nonviable   gestation. No adnexal masses or free fluid to raise index of suspicion for ectopic.     ASSESSMENT: Pregnancy of unknown location (PUL).     RECOMMENDATION: Continued clinical monitoring with serial beta hCG levels and repeat ultrasound in 14 days, if not warranted earlier for clinical reasons.     1013 HCG QUANTITATIVE(!): 4,674.0   1020 Rh Factor: Positive       Medications - No data to display    ED Risk Strat Scores                    No data recorded        SBIRT 22yo+      Flowsheet Row Most Recent Value   Initial Alcohol Screen: US AUDIT-C     1. How often do you have a drink containing alcohol? 0 Filed at: 2025   2. How many drinks containing alcohol do you have on a typical day you are drinking?  1 Filed at: 2025   3b. FEMALE Any Age, or MALE 65+: How often do you have 4 or more drinks on one occassion? 0 Filed at: 2025   Audit-C Score 1 Filed at: 2025   STEFANIA: How many times in the past year have you...    Used an illegal drug or used a prescription medication for non-medical reasons? Never Filed at: 2025                            History of Present Illness       Chief Complaint   Patient presents with    Vaginal Bleeding - Pregnant     Pt states she is approx 6 weeks pregnant, started this morning with light spotting and cramping        Past Medical History[1]   Past Surgical History[2]   Family History[3]   Social History[4]   E-Cigarette/Vaping    E-Cigarette Use Never User       E-Cigarette/Vaping Substances      I have reviewed and agree with the history as documented.     HPI  Patient is 19 y.o. female ,  LMP mid May, approximately 6 weeks pregnant with home pregnancy test presenting for abdominal cramping and vaginal bleeding. She reports noticed lower abdominal cramping and spotting this morning and concerned. Has not had confirmed IUP. Denies fever, chills, chest pain, SOB, n/v, urinary complaints. Denies vaginal discharge.     Review of Systems   Constitutional:  Negative for chills and fever.   HENT:  Negative for ear pain and sore throat.    Respiratory:  Negative for cough and shortness of breath.    Cardiovascular:  Negative for chest pain, palpitations and leg swelling.   Gastrointestinal:  Positive for abdominal pain. Negative for diarrhea, nausea and vomiting.   Genitourinary:  Positive for vaginal bleeding. Negative for dysuria, frequency, hematuria and vaginal discharge.   Musculoskeletal:  Negative for back pain and neck pain.   Skin:  Negative for rash.   Neurological:  Negative for dizziness, light-headedness and headaches.           Objective       ED Triage Vitals [06/18/25 0722]   Temperature Pulse Blood Pressure Respirations SpO2 Patient Position - Orthostatic VS   97.6 °F (36.4 °C) 93 119/61 18 99 % --      Temp src Heart Rate Source BP Location FiO2 (%) Pain Score    -- -- -- -- 3      Vitals      Date and Time Temp Pulse SpO2 Resp BP Pain Score FACES Pain Rating User   06/18/25 0722 97.6 °F (36.4 °C) 93 99 % 18 119/61 3 -- KIY            Physical Exam  Vitals reviewed.   Constitutional:       General: She is awake.   HENT:      Head: Normocephalic and atraumatic.      Mouth/Throat:      Mouth: Mucous membranes are moist.     Eyes:      Extraocular Movements: Extraocular movements intact.      Right eye: No nystagmus.      Left eye: No nystagmus.      Conjunctiva/sclera: Conjunctivae normal.      Pupils: Pupils are equal, round, and reactive to light.       Cardiovascular:      Rate and Rhythm: Normal rate and regular rhythm.      Pulses: Normal pulses.      Heart sounds: Normal heart sounds, S1  normal and S2 normal. Heart sounds not distant. No murmur heard.     No friction rub. No gallop.   Pulmonary:      Breath sounds: No stridor. No wheezing, rhonchi or rales.      Comments: CTA b/l   Abdominal:      General: Bowel sounds are normal.      Palpations: Abdomen is soft.      Comments: Lower abdominal TTP     Musculoskeletal:      Right lower leg: No edema.      Left lower leg: No edema.     Skin:     General: Skin is warm and dry.      Capillary Refill: Capillary refill takes less than 2 seconds.     Neurological:      Mental Status: She is alert and oriented to person, place, and time.      GCS: GCS eye subscore is 4. GCS verbal subscore is 5. GCS motor subscore is 6.      Cranial Nerves: Cranial nerves 2-12 are intact.      Sensory: Sensation is intact.      Motor: No weakness or pronator drift.      Coordination: Coordination normal. Finger-Nose-Finger Test normal.         Results Reviewed       Procedure Component Value Units Date/Time    hCG, quantitative [[id number]]  (Abnormal) Collected: 06/18/25 0805    Lab Status: Final result Specimen: Blood from Arm, Right Updated: 06/18/25 0916     HCG, Quant 4,674.0 mIU/mL     Narrative:       Expected Ranges:    HCG results between 5.0 and 25.0 mIU/mL may be indicative of early pregnancy but should be interpreted in light of the total clinical presentation.    HCG can rise to detectable levels in yasir and post menopausal women (0-11.6 mIU/mL).     Approximate               Approximate HCG  Gestation age          Concentration ( mIU/mL)  _____________          ______________________   Weeks                      HCG values  0.2-1                       5-50  1-2                           2-3                         100-5000  3-4                         500-91745  4-5                         1000-71889  5-6                         [id number]  6-8                         31215-214060  8-12                        60812-408639      Urine Microscopic  [[id number]]  (Abnormal) Collected: 06/18/25 0805    Lab Status: Final result Specimen: Urine, Clean Catch Updated: 06/18/25 0849     RBC, UA 4-10 /hpf      WBC, UA 1-2 /hpf      Epithelial Cells Occasional /hpf      Bacteria, UA None Seen /hpf      MUCUS THREADS Occasional    UA (URINE) with reflex to Scope [[id number]]  (Abnormal) Collected: 06/18/25 0805    Lab Status: Final result Specimen: Urine, Clean Catch Updated: 06/18/25 0845     Color, UA Light Yellow     Clarity, UA Clear     Specific Gravity, UA 1.029     pH, UA 7.5     Leukocytes, UA Negative     Nitrite, UA Negative     Protein, UA Trace mg/dl      Glucose, UA Negative mg/dl      Ketones, UA Negative mg/dl      Urobilinogen, UA <2.0 mg/dl      Bilirubin, UA Negative     Occult Blood, UA Negative    Basic metabolic panel [[id number]] Collected: 06/18/25 0805    Lab Status: Final result Specimen: Blood from Arm, Right Updated: 06/18/25 0836     Sodium 136 mmol/L      Potassium 3.8 mmol/L      Chloride 105 mmol/L      CO2 26 mmol/L      ANION GAP 5 mmol/L      BUN 19 mg/dL      Creatinine 0.65 mg/dL      Glucose 77 mg/dL      Calcium 9.0 mg/dL      eGFR 127 ml/min/1.73sq m     Narrative:      National Kid[employer] guidelines for Chronic Kidney Disease (CKD):     Stage 1 with normal or high GFR (GFR > 90 mL/min/1.73 square meters)    Stage 2 Mild CKD (GFR = 60-89 mL/min/1.73 square meters)    Stage 3A Moderate CKD (GFR = 45-59 mL/min/1.73 square meters)    Stage 3B Moderate CKD (GFR = 30-44 mL/min/1.73 square meters)    Stage 4 Severe CKD (GFR = 15-29 mL/min/1.73 square meters)    Stage 5 End Stage CKD (GFR <15 mL/min/1.73 square meters)  Note: GFR calculation is accurate only with a steady state creatinine    CBC and differential [[id number]] Collected: 06/18/25 0805    Lab Status: Final result Specimen: Blood from Arm, Right Updated: 06/18/25 0817     WBC 6.60 Thousand/uL      RBC 4.32 Million/uL      Hemoglobin 12.7 g/dL      Hematocrit 38.9 %       MCV 90 fL      MCH 29.4 pg      MCHC 32.6 g/dL      RDW 12.4 %      MPV 9.9 fL      Platelets 240 Thousands/uL      nRBC 0 /100 WBCs      Segmented % 61 %      Immature Grans % 0 %      Lymphocytes % 31 %      Monocytes % 6 %      Eosinophils Relative 1 %      Basophils Relative 1 %      Absolute Neutrophils 4.07 Thousands/µL      Absolute Immature Grans 0.01 Thousand/uL      Absolute Lymphocytes 2.06 Thousands/µL      Absolute Monocytes 0.37 Thousand/µL      Eosinophils Absolute 0.06 Thousand/µL      Basophils Absolute 0.03 Thousands/µL     POCT pregnancy, urine [[id number]]  (Abnormal) Collected: 06/18/25 0814    Lab Status: Final result Updated: 06/18/25 0814     EXT Preg Test, Ur Positive     Control Valid            US OB pregnancy limited with transvaginal   Final Interpretation by Kvng Corrigan MD (06/18 1008)      Small amount of intracavitary fluid at the uterine fundus, not definitive for intrauterine gestation. Appearance is nonspecific with differential considerations including early normal gestation, pseudogestational sac with ectopic pregnancy, or nonviable    gestation. No adnexal masses or free fluid to raise index of suspicion for ectopic.      ASSESSMENT: Pregnancy of unknown location (PUL).      RECOMMENDATION: Continued clinical monitoring with serial beta hCG levels and repeat ultrasound in 14 days, if not warranted earlier for clinical reasons.      Note: The study was marked in EPIC for immediate notification. Imaging follow-up reminder notification was scheduled in the electronic medical record.            Workstation performed: [id number]             POC Pelvic US    Date/Time: 6/18/2025 7:45 AM    Performed by: Ellie Wang DO  Authorized by: Ellie Wang DO    Patient location:  ED  Procedure details:     Exam Type:  Diagnostic    Indications: evaluate for IUP and pregnant with vaginal bleeding      Assessment for: confirm intrauterine pregnancy      Technique:   Transabdominal obstetric (HCG+) exam    Views obtained: pouch of Gonzalez      Image quality: non-diagnostic      Image availability:  Images available in PACS  Uterine findings:     Intrauterine pregnancy: not identified    Other findings:     Free pelvic fluid: not identified      Free peritoneal fluid: not identified    Interpretation:     Ultrasound impressions: indeterminate        ED Medication and Procedure Management   Prior to Admission Medications   Prescriptions Last Dose Informant Patient Reported? Taking?   etonogestrel (NEXPLANON) subdermal implant  Self Yes No   Si mg by Subdermal route once   Patient not taking: Reported on 2025   levonorgestrel-ethinyl estradiol (AVIANE,ALESSE,LESSINA) 0.1-20 MG-MCG per tablet  Self No No   Sig: take 1 tablet by mouth once daily   Patient not taking: Reported on 2025   methylPREDNISolone 4 MG tablet therapy pack   No No   Sig: Use as directed on package      Facility-Administered Medications: None     Discharge Medication List as of 2025 10:24 AM        CONTINUE these medications which have NOT CHANGED    Details   etonogestrel (NEXPLANON) subdermal implant 68 mg by Subdermal route once, Historical Med      levonorgestrel-ethinyl estradiol (AVIANE,ALESSE,LESSINA) 0.1-20 MG-MCG per tablet take 1 tablet by mouth once daily, Starting Thu 2024, Normal      methylPREDNISolone 4 MG tablet therapy pack Use as directed on package, Normal           Outpatient Discharge Orders   hCG, quantitative   Standing Status: Future Standing Exp. Date: 26     ED SEPSIS DOCUMENTATION   Time reflects when diagnosis was documented in both MDM as applicable and the Disposition within this note       Time User Action Codes Description Comment    2025 10:20 AM Ellie Wagn [Z34.91] First trimester pregnancy     2025 10:20 AM Ellie Wang [N93.9] Vaginal bleeding                    [1]   Past Medical History:  Diagnosis Date    Allergic      Visual impairment     glasses   [2] No past surgical history on file.  [3]   Family History  Problem Relation Name Age of Onset    Diabetes Mother      Hypertension Mother      Hypertension Father     [4]   Social History  Tobacco Use    Smoking status: Never    Smokeless tobacco: Never   Vaping Use    Vaping status: Never Used   Substance Use Topics    Alcohol use: Not Currently    Drug use: Never        Ellie Wang,   06/20/25 8598

## 2025-06-18 NOTE — ED NOTES
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 My first ob apt is July 21st\" pt aware of need for urine sample provided gown and warm blanket and cup of water      Sydney Beckett, ROBERTO CARLOS  06/18/25 6202    "

## 2025-06-18 NOTE — Clinical Note
Amy Jaramillo was seen and treated in our emergency department on 6/18/2025.    No restrictions            Diagnosis:     Amy  may return to work on return date.    She may return on this date: 06/19/2025         If you have any questions or concerns, please don't hesitate to call.      Ellie Wang, DO    ______________________________           _______________          _______________  Hospital Representative                              Date                                Time

## 2025-06-18 NOTE — DISCHARGE INSTRUCTIONS
You were seen in the emergency department for vaginal bleeding in pregnancy.  Your pregnancy was unable to be seen on ultrasound at this point.  Your pregnancy is currently of unknown location.  This could be just due to early pregnancy or early miscarriage.    Please get repeat beta hCG in 2 days, lab slip placed.    Please follow-up with OB/GYN.    Return to ED if severe abdominal pain, you pass out, heavy vaginal bleeding or other concerning symptoms

## 2025-06-18 NOTE — ED ATTENDING ATTESTATION
6/18/2025  I, Raghavendra Epperson MD, saw and evaluated the patient. I have discussed the patient with the resident/non-physician practitioner and agree with the resident's/non-physician practitioner's findings, Plan of Care, and MDM as documented in the resident's/non-physician practitioner's note, except where noted. All available labs and Radiology studies were reviewed.  I was present for key portions of any procedure(s) performed by the resident/non-physician practitioner and I was immediately available to provide assistance.       At this point I agree with the current assessment done in the Emergency Department.  I have conducted an independent evaluation of this patient a history and physical is as follows:    19-year-old woman with LMP 6 weeks ago and positive home pregnancy test presenting with vaginal bleeding and some abdominal cramping.  Fever.  No nausea or vomiting.  Patient is well-appearing in no acute distress with benign abdominal examination.  Labs and imaging were done.  Discussed ultrasound results with the patient, and discussed the need for repeat hCG and close OB/GYN follow-up.  Strict return precautions.    ED Course         Critical Care Time  Procedures

## 2025-06-30 ENCOUNTER — HOSPITAL ENCOUNTER (EMERGENCY)
Facility: HOSPITAL | Age: 20
Discharge: HOME | End: 2025-06-30
Attending: EMERGENCY MEDICINE | Admitting: EMERGENCY MEDICINE
Payer: COMMERCIAL

## 2025-06-30 ENCOUNTER — OFFICE VISIT (OUTPATIENT)
Dept: URGENT CARE | Age: 20
End: 2025-06-30
Payer: COMMERCIAL

## 2025-06-30 ENCOUNTER — APPOINTMENT (EMERGENCY)
Dept: ULTRASOUND IMAGING | Facility: HOSPITAL | Age: 20
End: 2025-06-30
Payer: COMMERCIAL

## 2025-06-30 VITALS
TEMPERATURE: 98.6 F | OXYGEN SATURATION: 100 % | HEART RATE: 80 BPM | SYSTOLIC BLOOD PRESSURE: 102 MMHG | DIASTOLIC BLOOD PRESSURE: 58 MMHG | RESPIRATION RATE: 18 BRPM

## 2025-06-30 VITALS
BODY MASS INDEX: 29.61 KG/M2 | DIASTOLIC BLOOD PRESSURE: 65 MMHG | RESPIRATION RATE: 18 BRPM | TEMPERATURE: 98.6 F | HEART RATE: 86 BPM | WEIGHT: 151.6 LBS | SYSTOLIC BLOOD PRESSURE: 118 MMHG | OXYGEN SATURATION: 100 %

## 2025-06-30 DIAGNOSIS — O26.899 ABDOMINAL PAIN IN EARLY PREGNANCY: Primary | ICD-10-CM

## 2025-06-30 DIAGNOSIS — Z34.91 FIRST TRIMESTER PREGNANCY: ICD-10-CM

## 2025-06-30 DIAGNOSIS — R10.9 ABDOMINAL PAIN IN EARLY PREGNANCY: Primary | ICD-10-CM

## 2025-06-30 DIAGNOSIS — R10.9 ABDOMINAL CRAMPING: Primary | ICD-10-CM

## 2025-06-30 LAB
ALBUMIN SERPL BCG-MCNC: 4 G/DL (ref 3.5–5)
ALP SERPL-CCNC: 68 U/L (ref 34–104)
ALT SERPL W P-5'-P-CCNC: 17 U/L (ref 7–52)
ANION GAP SERPL CALCULATED.3IONS-SCNC: 4 MMOL/L (ref 4–13)
AST SERPL W P-5'-P-CCNC: 19 U/L (ref 13–39)
B-HCG SERPL-ACNC: ABNORMAL MIU/ML (ref 0–5)
BACTERIA UR QL AUTO: ABNORMAL /HPF
BASOPHILS # BLD AUTO: 0.02 THOUSANDS/ÂΜL (ref 0–0.1)
BASOPHILS NFR BLD AUTO: 0 % (ref 0–1)
BILIRUB SERPL-MCNC: 0.72 MG/DL (ref 0.2–1)
BILIRUB UR QL STRIP: NEGATIVE
BUN SERPL-MCNC: 15 MG/DL (ref 5–25)
CALCIUM SERPL-MCNC: 9.2 MG/DL (ref 8.4–10.2)
CHLORIDE SERPL-SCNC: 105 MMOL/L (ref 96–108)
CLARITY UR: ABNORMAL
CO2 SERPL-SCNC: 28 MMOL/L (ref 21–32)
COLOR UR: YELLOW
CREAT SERPL-MCNC: 0.57 MG/DL (ref 0.6–1.3)
EOSINOPHIL # BLD AUTO: 0.05 THOUSAND/ÂΜL (ref 0–0.61)
EOSINOPHIL NFR BLD AUTO: 1 % (ref 0–6)
ERYTHROCYTE [DISTWIDTH] IN BLOOD BY AUTOMATED COUNT: 12.5 % (ref 11.6–15.1)
GFR SERPL CREATININE-BSD FRML MDRD: 132 ML/MIN/1.73SQ M
GLUCOSE SERPL-MCNC: 61 MG/DL (ref 65–140)
GLUCOSE UR STRIP-MCNC: NEGATIVE MG/DL
HCT VFR BLD AUTO: 37 % (ref 34.8–46.1)
HGB BLD-MCNC: 12.3 G/DL (ref 11.5–15.4)
HGB UR QL STRIP.AUTO: ABNORMAL
IMM GRANULOCYTES # BLD AUTO: 0.02 THOUSAND/UL (ref 0–0.2)
IMM GRANULOCYTES NFR BLD AUTO: 0 % (ref 0–2)
KETONES UR STRIP-MCNC: NEGATIVE MG/DL
LEUKOCYTE ESTERASE UR QL STRIP: NEGATIVE
LYMPHOCYTES # BLD AUTO: 2.13 THOUSANDS/ÂΜL (ref 0.6–4.47)
LYMPHOCYTES NFR BLD AUTO: 29 % (ref 14–44)
MCH RBC QN AUTO: 29.1 PG (ref 26.8–34.3)
MCHC RBC AUTO-ENTMCNC: 33.2 G/DL (ref 31.4–37.4)
MCV RBC AUTO: 88 FL (ref 82–98)
MONOCYTES # BLD AUTO: 0.61 THOUSAND/ÂΜL (ref 0.17–1.22)
MONOCYTES NFR BLD AUTO: 8 % (ref 4–12)
MUCOUS THREADS UR QL AUTO: ABNORMAL
NEUTROPHILS # BLD AUTO: 4.47 THOUSANDS/ÂΜL (ref 1.85–7.62)
NEUTS SEG NFR BLD AUTO: 62 % (ref 43–75)
NITRITE UR QL STRIP: NEGATIVE
NON-SQ EPI CELLS URNS QL MICRO: ABNORMAL /HPF
NRBC BLD AUTO-RTO: 0 /100 WBCS
PH UR STRIP.AUTO: 7 [PH] (ref 4.5–8)
PLATELET # BLD AUTO: 246 THOUSANDS/UL (ref 149–390)
PMV BLD AUTO: 10.2 FL (ref 8.9–12.7)
POTASSIUM SERPL-SCNC: 3.6 MMOL/L (ref 3.5–5.3)
PROT SERPL-MCNC: 6.7 G/DL (ref 6.4–8.4)
PROT UR STRIP-MCNC: NEGATIVE MG/DL
RBC # BLD AUTO: 4.23 MILLION/UL (ref 3.81–5.12)
RBC #/AREA URNS AUTO: ABNORMAL /HPF
SODIUM SERPL-SCNC: 137 MMOL/L (ref 135–147)
SP GR UR STRIP.AUTO: 1.02 (ref 1–1.03)
UROBILINOGEN UR QL STRIP.AUTO: 0.2 E.U./DL
WBC # BLD AUTO: 7.3 THOUSAND/UL (ref 4.31–10.16)
WBC #/AREA URNS AUTO: ABNORMAL /HPF

## 2025-06-30 PROCEDURE — 84702 CHORIONIC GONADOTROPIN TEST: CPT

## 2025-06-30 PROCEDURE — 96374 THER/PROPH/DIAG INJ IV PUSH: CPT

## 2025-06-30 PROCEDURE — 80053 COMPREHEN METABOLIC PANEL: CPT

## 2025-06-30 PROCEDURE — 36415 COLL VENOUS BLD VENIPUNCTURE: CPT

## 2025-06-30 PROCEDURE — 99284 EMERGENCY DEPT VISIT MOD MDM: CPT | Performed by: EMERGENCY MEDICINE

## 2025-06-30 PROCEDURE — 81001 URINALYSIS AUTO W/SCOPE: CPT

## 2025-06-30 PROCEDURE — 99284 EMERGENCY DEPT VISIT MOD MDM: CPT

## 2025-06-30 PROCEDURE — 99214 OFFICE O/P EST MOD 30 MIN: CPT

## 2025-06-30 PROCEDURE — 76705 ECHO EXAM OF ABDOMEN: CPT

## 2025-06-30 PROCEDURE — 87086 URINE CULTURE/COLONY COUNT: CPT

## 2025-06-30 PROCEDURE — 85025 COMPLETE CBC W/AUTO DIFF WBC: CPT

## 2025-06-30 PROCEDURE — 76801 OB US < 14 WKS SINGLE FETUS: CPT

## 2025-06-30 RX ORDER — ACETAMINOPHEN 325 MG/1
975 TABLET ORAL ONCE
Status: COMPLETED | OUTPATIENT
Start: 2025-06-30 | End: 2025-06-30

## 2025-06-30 RX ORDER — ONDANSETRON 2 MG/ML
4 INJECTION INTRAMUSCULAR; INTRAVENOUS ONCE
Status: COMPLETED | OUTPATIENT
Start: 2025-06-30 | End: 2025-06-30

## 2025-06-30 RX ORDER — ONDANSETRON 4 MG/1
4 TABLET, ORALLY DISINTEGRATING ORAL EVERY 6 HOURS PRN
Qty: 20 TABLET | Refills: 0 | Status: SHIPPED | OUTPATIENT
Start: 2025-06-30

## 2025-06-30 RX ADMIN — ACETAMINOPHEN 975 MG: 325 TABLET ORAL at 10:35

## 2025-06-30 RX ADMIN — ONDANSETRON 4 MG: 2 INJECTION INTRAMUSCULAR; INTRAVENOUS at 10:33

## 2025-06-30 NOTE — Clinical Note
Amy Jaramillo was seen and treated in our emergency department on 6/30/2025.                Diagnosis:     Amy  may return to work on return date.    She may return on this date: 07/01/2025         If you have any questions or concerns, please don't hesitate to call.      Radha Larsen PA-C    ______________________________           _______________          _______________  Hospital Representative                              Date                                Time

## 2025-06-30 NOTE — ED PROVIDER NOTES
Time reflects when diagnosis was documented in both MDM as applicable and the Disposition within this note       Time User Action Codes Description Comment    6/30/2025  2:52 PM Radha Larsen Add [O26.899,  R10.9] Abdominal pain in early pregnancy           ED Disposition       ED Disposition   Discharge    Condition   Stable    Date/Time   Mon Jun 30, 2025  2:51 PM    Comment   Amy Jaramillo discharge to home/self care.                   Assessment & Plan       Medical Decision Making  Patient is a 19-year-old female presenting with right-sided abdominal pain in early pregnancy.  Vitals are within normal limits on arrival and she is in no acute distress.    DDx: Cholecystitis, biliary colic, UTI, ectopic pregnancy, early viable pregnancy, appendicitis. She is not having any vaginal bleeding, recent labs show blood type of O+.  Plan: CBC, CMP, quantitative hCG, pelvic ultrasound, right upper quadrant ultrasound, symptomatic management.    Workup overall reassuring.  Ultrasound shows single IUP approximately 6 weeks 5 days with cardiac activity.  No adnexal mass.  The ultrasound of the appendix did not visualize the appendix however there are no secondary signs.  Patient also without leukocytosis so lower concern for appendicitis at this time.  I discussed with patient that if symptoms were to worsen she could return to the ED for further imaging.  Advised to follow-up with OB/GYN as scheduled return to ED with any worsening abdominal pain, fevers, or vaginal bleeding.    I have discussed findings and plan for discharge with the patient/caregiver. Follow up with the appropriate providers including primary care physician was discussed. Return precautions discussed with patient/caregiver as outlined in AVS. Patient/caregiver verbally expressed understanding. Patient stable at time of discharge and ambulated out of the emergency department.     Amount and/or Complexity of Data Reviewed  Labs: ordered.  Decision-making details documented in ED Course.  Radiology: ordered.    Risk  OTC drugs.  Prescription drug management.        ED Course as of 25 1645   Mon 2025   1045 Blood, UA(!): Trace  No other signs of infection.   1045 WBC: 7.30  Normal.   1120 Comprehensive metabolic panel(!)  No electrolyte disturbances, DAYANA, LFT elevation.   1138 HCG QUANTITATIVE(!): 51,636.0  4674 12 days ago.       Medications   ondansetron (ZOFRAN) injection 4 mg (4 mg Intravenous Given 25 1033)   acetaminophen (TYLENOL) tablet 975 mg (975 mg Oral Given 25 1035)       ED Risk Strat Scores                    No data recorded        SBIRT 22yo+      Flowsheet Row Most Recent Value   Initial Alcohol Screen: US AUDIT-C     1. How often do you have a drink containing alcohol? 0 Filed at: 2025 1006   2. How many drinks containing alcohol do you have on a typical day you are drinking?  0 Filed at: 2025 1006   3b. FEMALE Any Age, or MALE 65+: How often do you have 4 or more drinks on one occassion? 0 Filed at: 2025 1006   Audit-C Score 0 Filed at: 2025 1006   STEFANIA: How many times in the past year have you...    Used an illegal drug or used a prescription medication for non-medical reasons? Never Filed at: 2025 1006                            History of Present Illness       Chief Complaint   Patient presents with    Pelvic Pain - Pregnant     Pt rpeorts pelvic pain and states she is 7 weeks pregnant. Denies vaginal bleeding/discharge.        Past Medical History[1]   Past Surgical History[2]   Family History[3]   Social History[4]   E-Cigarette/Vaping    E-Cigarette Use Never User       E-Cigarette/Vaping Substances      I have reviewed and agree with the history as documented.     Patient is a 19-year-old  female presenting for evaluation of abdominal pain.  She is currently about 7 weeks pregnant and does not have a confirmed IUP.  Pain started overnight and is located in the right  upper quadrant.  She has some nausea but no vomiting.  Pain is not worsened by p.o. intake.  No diarrhea, constipation, urinary symptoms.  No vaginal bleeding or discharge.   No fevers.  No prior abdominal surgeries.  No medications prior to arrival.    Pelvic ultrasound 6/18/2025: Small amount of intracavitary fluid at uterine fundus, not definitive for IUP.  Differentials include early gestation, pseudo gestational sac with ectopic, nonviable gestation.          Review of Systems   Constitutional:  Negative for chills and fever.   HENT:  Negative for ear pain and sore throat.    Eyes:  Negative for pain and visual disturbance.   Respiratory:  Negative for cough and shortness of breath.    Cardiovascular:  Negative for chest pain and palpitations.   Gastrointestinal:  Positive for abdominal pain and nausea. Negative for constipation, diarrhea and vomiting.   Genitourinary:  Negative for dysuria and hematuria.   Musculoskeletal:  Negative for arthralgias and back pain.   Skin:  Negative for color change and rash.   Neurological:  Negative for seizures and syncope.   All other systems reviewed and are negative.          Objective       ED Triage Vitals   Temperature Pulse Blood Pressure Respirations SpO2 Patient Position - Orthostatic VS   06/30/25 1004 06/30/25 1004 06/30/25 1004 06/30/25 1004 06/30/25 1004 06/30/25 1128   98.6 °F (37 °C) 80 108/51 18 98 % Lying      Temp Source Heart Rate Source BP Location FiO2 (%) Pain Score    06/30/25 1004 06/30/25 1004 06/30/25 1004 -- 06/30/25 1035    Oral Monitor Right arm  2      Vitals      Date and Time Temp Pulse SpO2 Resp BP Pain Score FACES Pain Rating User   06/30/25 1403 -- 80 100 % 18 102/58 No Pain --    06/30/25 1128 -- 72 99 % 18 98/51 -- --    06/30/25 1035 -- -- -- -- -- 2 --    06/30/25 1004 98.6 °F (37 °C) 80 98 % 18 108/51 -- -- HMR            Physical Exam  Vitals and nursing note reviewed.   Constitutional:       General: She is not in acute  distress.     Appearance: Normal appearance. She is not toxic-appearing.   HENT:      Head: Normocephalic and atraumatic.      Right Ear: External ear normal.      Left Ear: External ear normal.      Nose: Nose normal.      Mouth/Throat:      Mouth: Mucous membranes are moist.     Eyes:      General: No scleral icterus.        Right eye: No discharge.         Left eye: No discharge.      Extraocular Movements: Extraocular movements intact.      Conjunctiva/sclera: Conjunctivae normal.       Cardiovascular:      Rate and Rhythm: Normal rate.      Pulses: Normal pulses.   Pulmonary:      Effort: Pulmonary effort is normal. No respiratory distress.   Abdominal:      Tenderness: There is abdominal tenderness in the right lower quadrant. There is no right CVA tenderness, left CVA tenderness, guarding or rebound.     Musculoskeletal:         General: No tenderness, deformity or signs of injury.      Cervical back: Normal range of motion and neck supple.     Skin:     General: Skin is dry.      Coloration: Skin is not jaundiced.      Findings: No erythema or rash.     Neurological:      General: No focal deficit present.      Mental Status: She is alert and oriented to person, place, and time. Mental status is at baseline.      Motor: No weakness.      Gait: Gait normal.     Psychiatric:         Mood and Affect: Mood normal.         Behavior: Behavior normal.         Thought Content: Thought content normal.         Results Reviewed       Procedure Component Value Units Date/Time    Quantitative hCG [[id number]]  (Abnormal) Collected: 06/30/25 1033    Lab Status: Final result Specimen: Blood from Arm, Right Updated: 06/30/25 1138     HCG, Quant 51,636.0 mIU/mL     Narrative:       Expected Ranges:    HCG results between 5.0 and 25.0 mIU/mL may be indicative of early pregnancy but should be interpreted in light of the total clinical presentation.    HCG can rise to detectable levels in yasir and post menopausal women (0-11.6  mIU/mL).     Approximate               Approximate HCG  Gestation age          Concentration ( mIU/mL)  _____________          ______________________   Weeks                      HCG values  0.2-1                       5-50  1-2                           2-3                         100-5000  3-4                         500-04288  4-5                         1000-45618  5-6                         [id number]  6-8                         73555-682195  8-12                        39683-144037      Comprehensive metabolic panel [[id number]]  (Abnormal) Collected: 06/30/25 1033    Lab Status: Final result Specimen: Blood from Arm, Right Updated: 06/30/25 1109     Sodium 137 mmol/L      Potassium 3.6 mmol/L      Chloride 105 mmol/L      CO2 28 mmol/L      ANION GAP 4 mmol/L      BUN 15 mg/dL      Creatinine 0.57 mg/dL      Glucose 61 mg/dL      Calcium 9.2 mg/dL      AST 19 U/L      ALT 17 U/L      Alkaline Phosphatase 68 U/L      Total Protein 6.7 g/dL      Albumin 4.0 g/dL      Total Bilirubin 0.72 mg/dL      eGFR 132 ml/min/1.73sq m     Narrative:      National Kidney Disease Foundation guidelines for Chronic Kidney Disease (CKD):     Stage 1 with normal or high GFR (GFR > 90 mL/min/1.73 square meters)    Stage 2 Mild CKD (GFR = 60-89 mL/min/1.73 square meters)    Stage 3A Moderate CKD (GFR = 45-59 mL/min/1.73 square meters)    Stage 3B Moderate CKD (GFR = 30-44 mL/min/1.73 square meters)    Stage 4 Severe CKD (GFR = 15-29 mL/min/1.73 square meters)    Stage 5 End Stage CKD (GFR <15 mL/min/1.73 square meters)  Note: GFR calculation is accurate only with a steady state creatinine    Urine culture [[id number]] Collected: 06/30/25 1037    Lab Status: In process Specimen: Urine Updated: 06/30/25 1103    Urine Microscopic [[id number]]  (Abnormal) Collected: 06/30/25 1037    Lab Status: Final result Specimen: Urine Updated: 06/30/25 1048     RBC, UA 2-4 /hpf      WBC, UA None Seen /hpf      Epithelial Cells Occasional  /hpf      Bacteria, UA None Seen /hpf      MUCUS THREADS Moderate    CBC and differential [[id number]] Collected: 06/30/25 1033    Lab Status: Final result Specimen: Blood from Arm, Right Updated: 06/30/25 1043     WBC 7.30 Thousand/uL      RBC 4.23 Million/uL      Hemoglobin 12.3 g/dL      Hematocrit 37.0 %      MCV 88 fL      MCH 29.1 pg      MCHC 33.2 g/dL      RDW 12.5 %      MPV 10.2 fL      Platelets 246 Thousands/uL      nRBC 0 /100 WBCs      Segmented % 62 %      Immature Grans % 0 %      Lymphocytes % 29 %      Monocytes % 8 %      Eosinophils Relative 1 %      Basophils Relative 0 %      Absolute Neutrophils 4.47 Thousands/µL      Absolute Immature Grans 0.02 Thousand/uL      Absolute Lymphocytes 2.13 Thousands/µL      Absolute Monocytes 0.61 Thousand/µL      Eosinophils Absolute 0.05 Thousand/µL      Basophils Absolute 0.02 Thousands/µL     Urine Macroscopic, POC [[id number]]  (Abnormal) Collected: 06/30/25 1037    Lab Status: Final result Specimen: Urine Updated: 06/30/25 1039     Color, UA Yellow     Clarity, UA Slightly Cloudy     pH, UA 7.0     Leukocytes, UA Negative     Nitrite, UA Negative     Protein, UA Negative mg/dl      Glucose, UA Negative mg/dl      Ketones, UA Negative mg/dl      Urobilinogen, UA 0.2 E.U./dl      Bilirubin, UA Negative     Occult Blood, UA Trace     Specific Gravity, UA 1.025    Narrative:      CLINITEK RESULT            US appendix   Final Interpretation by David Ricci MD (06/30 1425)      Although the appendix is not identified, there are no secondary sonographic findings to suggest acute appendicitis.            Workstation performed: [id number]         US OB < 14 weeks with transvaginal   Final Interpretation by Lisa Ritchie MD (06/30 5788)      Single intrauterine pregnancy with gestational age of approximately 6 weeks, 5 days and cardiac activity of 130 bpm. No adnexal mass. Trace pelvic free fluid, nonspecific.      NIDIA of 02/18/2026.         Workstation  performed: [id number]             Procedures    ED Medication and Procedure Management   Prior to Admission Medications   Prescriptions Last Dose Informant Patient Reported? Taking?   etonogestrel (NEXPLANON) subdermal implant  Self Yes No   Si mg by Subdermal route once   Patient not taking: Reported on 2025   levonorgestrel-ethinyl estradiol (AVIANE,ALESSE,LESSINA) 0.1-20 MG-MCG per tablet  Self No No   Sig: take 1 tablet by mouth once daily   Patient not taking: Reported on 2025   methylPREDNISolone 4 MG tablet therapy pack   No Yes   Sig: Use as directed on package      Facility-Administered Medications: None     Discharge Medication List as of 2025  2:53 PM        START taking these medications    Details   ondansetron (ZOFRAN-ODT) 4 mg disintegrating tablet Take 1 tablet (4 mg total) by mouth every 6 (six) hours as needed for nausea or vomiting, Starting Mon 2025, Normal           STOP taking these medications       methylPREDNISolone 4 MG tablet therapy pack Comments:   Reason for Stopping:         etonogestrel (NEXPLANON) subdermal implant Comments:   Reason for Stopping:         levonorgestrel-ethinyl estradiol (AVIANE,ALESSE,LESSINA) 0.1-20 MG-MCG per tablet Comments:   Reason for Stopping:             No discharge procedures on file.  ED SEPSIS DOCUMENTATION   Time reflects when diagnosis was documented in both MDM as applicable and the Disposition within this note       Time User Action Codes Description Comment    2025  2:52 PM Radha Larsen Add [O26.899,  R10.9] Abdominal pain in early pregnancy                      [1]   Past Medical History:  Diagnosis Date    Allergic     Visual impairment     glasses   [2] No past surgical history on file.  [3]   Family History  Problem Relation Name Age of Onset    Diabetes Mother      Hypertension Mother      Hypertension Father     [4]   Social History  Tobacco Use    Smoking status: Never    Smokeless tobacco: Never   Vaping Use     Vaping status: Never Used   Substance Use Topics    Alcohol use: Not Currently    Drug use: Never        Radha Larsen PA-C  06/30/25 2946

## 2025-06-30 NOTE — DISCHARGE INSTRUCTIONS
Your workup today was reassuring.  Please follow-up with OB/GYN as scheduled.  Take Zofran for nausea/vomiting and Tylenol for pain.  Return to ED with any worsening pain, fevers.

## 2025-06-30 NOTE — PROGRESS NOTES
"[facility]  Name: Amy Jaramillo      : 2005      MRN: [id number]  Encounter Provider: DIANA Hughes  Encounter Date: 2025   Encounter department: [facility]  :  Assessment & Plan  Abdominal cramping    Orders:    Transfer to other facility    First trimester pregnancy    Orders:    Transfer to other facility    Patient agreeable to proceed to the emergency department for further evaluation.  Offered EMS, patient declined.  Patient requesting to drive by private vehicle.  Patient agreeable to go to [facility] ER.  Stable at time of leaving [facility] now.  Call in given to [facility] ER.    Patient Instructions  Proceed to the ER for further evaluation.       Chief Complaint:   Chief Complaint   Patient presents with    Abdominal Pain     Pt is about 7 weeks pregnant and having cramping \"pretty consistently\" on  pt was seen in the ED for vaginal bleeding was told to get a quant done 2 days after the visit but didn't have that done.     History of Present Illness   Patient is a 19-year-old female who presents with right lower quadrant abdominal pain cramping.  Patient states she is approximately 7 weeks pregnant.  States that the pain started this morning.  States she was seen in the emergency department approximately a week and a half ago and had an ultrasound where they recommended repeat ultrasound and blood work.  Denies vaginal bleeding.  Reports nausea no vomiting.  Denies fever.          Review of Systems   Constitutional:  Negative for fever.   Gastrointestinal:  Positive for abdominal pain (cramping/RLQ pain) and nausea. Negative for vomiting.   Genitourinary:  Negative for vaginal bleeding.   All other systems reviewed and are negative.    Past Medical History   Past Medical History[1]  Past Surgical History[2]  Family History[3]  she reports that she has never smoked. She has never used smokeless tobacco. She reports that she " "does not currently use alcohol. She reports that she does not use drugs.  Current Outpatient Medications   Medication Instructions    etonogestrel (NEXPLANON) 68 mg, Once    levonorgestrel-ethinyl estradiol (AVIANE,ALESSE,LESSINA) 0.1-20 MG-MCG per tablet 1 tablet, Oral, Daily    methylPREDNISolone 4 MG tablet therapy pack Use as directed on package   Allergies[4]     Objective   /65   Pulse 86   Temp 98.6 °F (37 °C)   Resp 18   Wt 68.8 kg (151 lb 9.6 oz)   LMP 03/11/2025 (Exact Date)   SpO2 100%   BMI 29.61 kg/m²      Physical Exam  Vitals and nursing note reviewed.   Constitutional:       General: She is not in acute distress.     Appearance: Normal appearance. She is well-developed. She is not ill-appearing, toxic-appearing or diaphoretic.     Cardiovascular:      Rate and Rhythm: Normal rate.      Pulses: Normal pulses.      Heart sounds: Normal heart sounds, S1 normal and S2 normal.   Pulmonary:      Effort: Pulmonary effort is normal.      Breath sounds: Normal breath sounds and air entry.     Skin:     General: Skin is warm.      Capillary Refill: Capillary refill takes less than 2 seconds.     Neurological:      Mental Status: She is alert.     Psychiatric:         Mood and Affect: Mood normal.         Behavior: Behavior normal.         Thought Content: Thought content normal.         Judgment: Judgment normal.         Portions of the record may have been created with voice recognition software.  Occasional wrong word or \"sound a like\" substitutions may have occurred due to the inherent limitations of voice recognition software.  Read the chart carefully and recognize, using context, where substitutions have occurred.       [1]   Past Medical History:  Diagnosis Date    Allergic     Visual impairment     glasses   [2] No past surgical history on file.  [3]   Family History  Problem Relation Name Age of Onset    Diabetes Mother      Hypertension Mother      Hypertension Father     [4] "   Allergies  Allergen Reactions    Other Rash     Action Taken: rick peal; Annotation - [id number]: rick peal

## 2025-07-01 LAB
BACTERIA UR CULT: ABNORMAL
BACTERIA UR CULT: ABNORMAL

## 2025-07-08 ENCOUNTER — TELEPHONE (OUTPATIENT)
Dept: OBGYN CLINIC | Facility: MEDICAL CENTER | Age: 20
End: 2025-07-08

## 2025-07-08 NOTE — TELEPHONE ENCOUNTER
Lvm - working our wait list: seeing if pt wanted a sooner appointment that opened next week.  
- - -

## 2025-07-21 ENCOUNTER — TELEPHONE (OUTPATIENT)
Age: 20
End: 2025-07-21

## 2025-07-21 ENCOUNTER — ULTRASOUND (OUTPATIENT)
Dept: OBGYN CLINIC | Facility: MEDICAL CENTER | Age: 20
End: 2025-07-21

## 2025-07-21 VITALS
DIASTOLIC BLOOD PRESSURE: 68 MMHG | HEIGHT: 60 IN | WEIGHT: 147.7 LBS | BODY MASS INDEX: 29 KG/M2 | SYSTOLIC BLOOD PRESSURE: 120 MMHG

## 2025-07-21 DIAGNOSIS — Z32.01 POSITIVE PREGNANCY TEST: Primary | ICD-10-CM

## 2025-07-21 DIAGNOSIS — Z32.01 PREGNANCY EXAMINATION OR TEST, POSITIVE RESULT: Primary | ICD-10-CM

## 2025-07-21 NOTE — LETTER
July 23, 2025     Patient: Amy Jaramillo  YOB: 2005      To Whom It May Concern:    The above named patient is currently under our care for her pregnancy.      General recommendations for pregnancy include:   -No lifting, pulling or pushing anything greater than 20 lbs.   -No continuous standing for greater than 2 hours without a 15 minute break.   -No working in an area where fainting would be a hazard. This would include   any working environment that has temperatures too high or too low or standing   at high altitudes.  -No climbing up and down ladders frequently.     -No working in environments that have temperatures greater than 85 degrees   farenheit.   -No working in areas with tobacco smoke or where organic exhaust is present.   -The patient will need frequent access to bathroom facilities, hydration, and food.  -Allow for a 15 minute break every 2-4 hours, as needed, at which time she should elevate her feet.   -Allow for 30 minute meal breaks in an 8 hour shift.    If you have any questions or concerns, please don't hesitate to call.     Thanks,  [facility]

## 2025-07-21 NOTE — PROGRESS NOTES
Serial transvaginal images were obtained through the gravid maternal uterus. The patient's LMP was 5/14/2025 with an NIDIA of  2/18/2026 and a gestational age of  9w5d (based on LMP).   The indication for today's examination is to confirm fetal size and viability.     CRL=               2.95cm    9w5d    NIDIA 2/17/2026  YS=                 3.8mm  FHR=               168bpm     The uterus and bilateral ovaries appear within normal limits.      Uterus=           13.10 x 7.25 x 3.19cm  Rt Ovary=        3.85 x 2.12 x 1.83cm  Lt. Ovary=       3.97 x 1.78 x 2.13cm     Impression:     Single, viable IUP.      Millie Bansal RDMS    InstaEDUier P8Uh-[id number] transvaginal transducer Serial #[id number] was used to perform the examination today and subsequently followed with high level disinfection utilizing Trophon EPR procedure.      Ultrasound performed at:      [facility] [address]  [place] [address]  Phone:  [phone]  Fax:  [phone]      Bill For Simulation And Treatment Device Design: Yes - (Complex Simulation: 21575)

## 2025-07-21 NOTE — TELEPHONE ENCOUNTER
Pt seen in office today for dating and viability and is requesting a phone call from Dr. Ospina. She did not specify reason for call. Please follow up with pt.

## 2025-07-22 NOTE — TELEPHONE ENCOUNTER
"Patient called to follow up on provider request.   Pt states they work for [employer] which includes heavy lifting. Employer can not offer light duty however if a physician letter is written specifying restriction of certain lbs, they will place pt on short term disability.   Pt states it can not use verbiage \"recommend lifting no more than....\", must state pt can not lift over xx weight.     Please follow up with pt.   "

## 2025-07-23 NOTE — TELEPHONE ENCOUNTER
Who called:STAFF     Is the patient Pregnant ?Yes  If so, How many weeks? 10wks    Reason for the Call:Patient requested work letter    Action Taken:Spoke to Patient      Outcome/Plan/ Recommendations:  Called patient to make her aware the letter she requested has been placed in her MyChart.

## 2025-07-30 ENCOUNTER — TELEPHONE (OUTPATIENT)
Dept: OBGYN CLINIC | Facility: MEDICAL CENTER | Age: 20
End: 2025-07-30

## 2025-08-06 ENCOUNTER — INITIAL PRENATAL (OUTPATIENT)
Dept: OBGYN CLINIC | Facility: MEDICAL CENTER | Age: 20
End: 2025-08-06

## 2025-08-06 VITALS
WEIGHT: 147.8 LBS | BODY MASS INDEX: 29.02 KG/M2 | DIASTOLIC BLOOD PRESSURE: 66 MMHG | SYSTOLIC BLOOD PRESSURE: 104 MMHG | HEIGHT: 60 IN

## 2025-08-06 DIAGNOSIS — Z34.81 PRENATAL CARE, SUBSEQUENT PREGNANCY, FIRST TRIMESTER: Primary | ICD-10-CM

## 2025-08-12 PROBLEM — Z3A.13 13 WEEKS GESTATION OF PREGNANCY: Status: ACTIVE | Noted: 2025-08-12

## 2025-08-12 PROBLEM — O09.292 PREVIOUS PREGNANCY COMPLICATED BY INTRAUTERINE GROWTH RESTRICTION, ANTEPARTUM, SECOND TRIMESTER: Status: ACTIVE | Noted: 2025-08-12

## 2025-08-14 ENCOUNTER — ANCILLARY PROCEDURE (OUTPATIENT)
Dept: PERINATAL CARE | Facility: OTHER | Age: 20
End: 2025-08-14
Attending: OBSTETRICS & GYNECOLOGY
Payer: COMMERCIAL

## 2025-08-14 PROBLEM — O43.191 MARGINAL INSERTION OF UMBILICAL CORD AFFECTING MANAGEMENT OF MOTHER IN FIRST TRIMESTER: Status: ACTIVE | Noted: 2025-08-14

## 2025-08-21 PROBLEM — Z97.5 BREAKTHROUGH BLEEDING ON NEXPLANON: Status: RESOLVED | Noted: 2021-10-18 | Resolved: 2025-08-21

## 2025-08-21 PROBLEM — N92.1 BREAKTHROUGH BLEEDING ON NEXPLANON: Status: RESOLVED | Noted: 2021-10-18 | Resolved: 2025-08-21

## 2025-08-22 ENCOUNTER — INITIAL PRENATAL (OUTPATIENT)
Dept: OBGYN CLINIC | Facility: MEDICAL CENTER | Age: 20
End: 2025-08-22

## 2025-08-22 VITALS — DIASTOLIC BLOOD PRESSURE: 60 MMHG | BODY MASS INDEX: 29.69 KG/M2 | SYSTOLIC BLOOD PRESSURE: 100 MMHG | WEIGHT: 152 LBS

## 2025-08-22 DIAGNOSIS — Z3A.14 14 WEEKS GESTATION OF PREGNANCY: ICD-10-CM

## 2025-08-22 DIAGNOSIS — Z12.4 SCREENING FOR CERVICAL CANCER: Primary | ICD-10-CM

## 2025-08-22 DIAGNOSIS — O09.292 PREVIOUS PREGNANCY COMPLICATED BY INTRAUTERINE GROWTH RESTRICTION, ANTEPARTUM, SECOND TRIMESTER: ICD-10-CM

## 2025-08-22 DIAGNOSIS — Z3A.13 13 WEEKS GESTATION OF PREGNANCY: ICD-10-CM

## 2025-08-22 DIAGNOSIS — O43.191 MARGINAL INSERTION OF UMBILICAL CORD AFFECTING MANAGEMENT OF MOTHER IN FIRST TRIMESTER: ICD-10-CM

## 2025-08-22 PROCEDURE — PNV: Performed by: OBSTETRICS & GYNECOLOGY

## 2025-08-26 LAB
C TRACH DNA SPEC QL NAA+PROBE: NEGATIVE
N GONORRHOEA DNA SPEC QL NAA+PROBE: NEGATIVE